# Patient Record
Sex: FEMALE | URBAN - METROPOLITAN AREA
[De-identification: names, ages, dates, MRNs, and addresses within clinical notes are randomized per-mention and may not be internally consistent; named-entity substitution may affect disease eponyms.]

---

## 2022-09-13 ENCOUNTER — TRANSCRIBE ORDERS (OUTPATIENT)
Dept: ADMINISTRATIVE | Facility: HOSPITAL | Age: 22
End: 2022-09-13

## 2022-09-13 DIAGNOSIS — R52 PAIN: Primary | ICD-10-CM

## 2023-08-10 ENCOUNTER — TELEPHONE (OUTPATIENT)
Dept: ONCOLOGY | Facility: CLINIC | Age: 23
End: 2023-08-10

## 2023-08-10 NOTE — TELEPHONE ENCOUNTER
Provider: mahamed  Caller: arash  Relationship to Patient: self  Call Back Phone Number: [phone]  Reason for Call: Pt has stomach ache.    Was reason for call a RED FLAG- Refer to list and notify RN in Person!    Please Follow if Clinical Question   If question about symptom, when did it start: today  If there is pain, please rate on scale of 1-10: 3  When was the patient last seen: last week